# Patient Record
(demographics unavailable — no encounter records)

---

## 2025-05-01 NOTE — PHYSICAL EXAM
[Appropriately responsive] : appropriately responsive [Alert] : alert [Regular Rate Rhythm] : regular rate rhythm [Oriented x3] : oriented x3

## 2025-05-01 NOTE — PLAN
[FreeTextEntry1] : 40 y/o with urinary symptoms  Plan: - Urine culture and UA collected - Rx for bactrim - will f/u results - pelvic floor PT discussed

## 2025-05-01 NOTE — REVIEW OF SYSTEMS
[Urgency] : urgency [Frequency] : frequency [Incontinence] : incontinence [Dysuria] : dysuria [Negative] : Heme/Lymph

## 2025-05-01 NOTE — HISTORY OF PRESENT ILLNESS
[FreeTextEntry1] : 40 y/o presents with dysuria, frequency. Also reports sensation of incomplete voiding.  Has some episodes of incontinence, mostly when rushing to bathroom, sneezes, coughs, etc Denies vaginal symptoms